# Patient Record
Sex: MALE | Race: WHITE | Employment: OTHER | URBAN - METROPOLITAN AREA
[De-identification: names, ages, dates, MRNs, and addresses within clinical notes are randomized per-mention and may not be internally consistent; named-entity substitution may affect disease eponyms.]

---

## 2017-04-01 ENCOUNTER — ED HISTORICAL/CONVERTED ENCOUNTER (OUTPATIENT)
Dept: OTHER | Age: 67
End: 2017-04-01

## 2018-10-21 ENCOUNTER — IP HISTORICAL/CONVERTED ENCOUNTER (OUTPATIENT)
Dept: OTHER | Age: 68
End: 2018-10-21

## 2018-11-07 ENCOUNTER — OP HISTORICAL/CONVERTED ENCOUNTER (OUTPATIENT)
Dept: OTHER | Age: 68
End: 2018-11-07

## 2018-11-14 ENCOUNTER — HOSPITAL ENCOUNTER (OUTPATIENT)
Dept: LAB | Age: 68
Discharge: HOME | End: 2018-11-14

## 2018-11-14 PROCEDURE — 88312 SPECIAL STAINS GROUP 1: CPT

## 2022-10-15 ENCOUNTER — APPOINTMENT (OUTPATIENT)
Dept: GENERAL RADIOLOGY | Age: 72
DRG: 092 | End: 2022-10-15
Attending: STUDENT IN AN ORGANIZED HEALTH CARE EDUCATION/TRAINING PROGRAM
Payer: MEDICARE

## 2022-10-15 ENCOUNTER — HOSPITAL ENCOUNTER (INPATIENT)
Age: 72
LOS: 1 days | Discharge: LEFT BEFORE DISCHARGE | DRG: 092 | End: 2022-10-17
Attending: STUDENT IN AN ORGANIZED HEALTH CARE EDUCATION/TRAINING PROGRAM | Admitting: INTERNAL MEDICINE
Payer: MEDICARE

## 2022-10-15 ENCOUNTER — APPOINTMENT (OUTPATIENT)
Dept: CT IMAGING | Age: 72
DRG: 092 | End: 2022-10-15
Attending: STUDENT IN AN ORGANIZED HEALTH CARE EDUCATION/TRAINING PROGRAM
Payer: MEDICARE

## 2022-10-15 DIAGNOSIS — R41.0 DELIRIUM: ICD-10-CM

## 2022-10-15 DIAGNOSIS — A41.9 SEPSIS, DUE TO UNSPECIFIED ORGANISM, UNSPECIFIED WHETHER ACUTE ORGAN DYSFUNCTION PRESENT (HCC): ICD-10-CM

## 2022-10-15 DIAGNOSIS — R09.02 HYPOXIA: Primary | ICD-10-CM

## 2022-10-15 PROBLEM — R41.82 ALTERED MENTAL STATUS: Status: ACTIVE | Noted: 2022-10-15

## 2022-10-15 LAB
ALBUMIN SERPL-MCNC: 3.4 G/DL (ref 3.5–5)
ALBUMIN/GLOB SERPL: 0.9 {RATIO} (ref 1.1–2.2)
ALP SERPL-CCNC: 72 U/L (ref 45–117)
ALT SERPL-CCNC: 18 U/L (ref 12–78)
AMPHET UR QL SCN: NEGATIVE
ANION GAP SERPL CALC-SCNC: 10 MMOL/L (ref 5–15)
APPEARANCE UR: CLEAR
AST SERPL W P-5'-P-CCNC: ABNORMAL U/L (ref 15–37)
ATRIAL RATE: 93 BPM
BACTERIA URNS QL MICRO: NEGATIVE /HPF
BARBITURATES UR QL SCN: NEGATIVE
BASOPHILS # BLD: 0.1 K/UL (ref 0–0.1)
BASOPHILS NFR BLD: 1 % (ref 0–1)
BENZODIAZ UR QL: NEGATIVE
BILIRUB SERPL-MCNC: 0.6 MG/DL (ref 0.2–1)
BILIRUB UR QL: NEGATIVE
BNP SERPL-MCNC: 164 PG/ML
BUN SERPL-MCNC: 15 MG/DL (ref 6–20)
BUN/CREAT SERPL: 11 (ref 12–20)
CA-I BLD-MCNC: 8.9 MG/DL (ref 8.5–10.1)
CALCULATED P AXIS, ECG09: 65 DEGREES
CALCULATED R AXIS, ECG10: 13 DEGREES
CALCULATED T AXIS, ECG11: 66 DEGREES
CANNABINOIDS UR QL SCN: NEGATIVE
CHLORIDE SERPL-SCNC: 101 MMOL/L (ref 97–108)
CK SERPL-CCNC: 202 U/L (ref 39–308)
CO2 SERPL-SCNC: 22 MMOL/L (ref 21–32)
COCAINE UR QL SCN: NEGATIVE
COLOR UR: ABNORMAL
COVID-19 RAPID TEST, COVR: NOT DETECTED
CREAT SERPL-MCNC: 1.33 MG/DL (ref 0.7–1.3)
D DIMER PPP FEU-MCNC: 2.2 UG/ML(FEU)
DIAGNOSIS, 93000: NORMAL
DIFFERENTIAL METHOD BLD: ABNORMAL
DRUG SCRN COMMENT,DRGCM: NORMAL
EOSINOPHIL # BLD: 0.3 K/UL (ref 0–0.4)
EOSINOPHIL NFR BLD: 2 % (ref 0–7)
ERYTHROCYTE [DISTWIDTH] IN BLOOD BY AUTOMATED COUNT: 12.4 % (ref 11.5–14.5)
ETHANOL SERPL-MCNC: <10 MG/DL
FLUAV AG NPH QL IA: NEGATIVE
FLUBV AG NOSE QL IA: NEGATIVE
GLOBULIN SER CALC-MCNC: 4 G/DL (ref 2–4)
GLUCOSE BLD STRIP.AUTO-MCNC: 105 MG/DL (ref 65–100)
GLUCOSE BLD STRIP.AUTO-MCNC: 146 MG/DL (ref 65–100)
GLUCOSE BLD STRIP.AUTO-MCNC: 157 MG/DL (ref 65–100)
GLUCOSE BLD STRIP.AUTO-MCNC: 165 MG/DL (ref 65–100)
GLUCOSE SERPL-MCNC: 259 MG/DL (ref 65–100)
GLUCOSE UR STRIP.AUTO-MCNC: 150 MG/DL
HCT VFR BLD AUTO: 46.5 % (ref 36.6–50.3)
HGB BLD-MCNC: 16.1 G/DL (ref 12.1–17)
HGB UR QL STRIP: ABNORMAL
IMM GRANULOCYTES # BLD AUTO: 0.1 K/UL (ref 0–0.04)
IMM GRANULOCYTES NFR BLD AUTO: 1 % (ref 0–0.5)
KETONES UR QL STRIP.AUTO: NEGATIVE MG/DL
LACTATE SERPL-SCNC: 1.4 MMOL/L (ref 0.4–2)
LACTATE SERPL-SCNC: 2.5 MMOL/L (ref 0.4–2)
LEUKOCYTE ESTERASE UR QL STRIP.AUTO: NEGATIVE
LYMPHOCYTES # BLD: 3.2 K/UL (ref 0.8–3.5)
LYMPHOCYTES NFR BLD: 15 % (ref 12–49)
MAGNESIUM SERPL-MCNC: 2 MG/DL (ref 1.6–2.4)
MAGNESIUM SERPL-MCNC: NORMAL MG/DL (ref 1.6–2.4)
MCH RBC QN AUTO: 31.3 PG (ref 26–34)
MCHC RBC AUTO-ENTMCNC: 34.6 G/DL (ref 30–36.5)
MCV RBC AUTO: 90.5 FL (ref 80–99)
METHADONE UR QL: NEGATIVE
MONOCYTES # BLD: 0.8 K/UL (ref 0–1)
MONOCYTES NFR BLD: 4 % (ref 5–13)
MUCOUS THREADS URNS QL MICRO: ABNORMAL /LPF
NEUTS SEG # BLD: 16.2 K/UL (ref 1.8–8)
NEUTS SEG NFR BLD: 77 % (ref 32–75)
NITRITE UR QL STRIP.AUTO: NEGATIVE
NRBC # BLD: 0 K/UL (ref 0–0.01)
NRBC BLD-RTO: 0 PER 100 WBC
OPIATES UR QL: NEGATIVE
P-R INTERVAL, ECG05: 178 MS
PCP UR QL: NEGATIVE
PERFORMED BY, TECHID: ABNORMAL
PH UR STRIP: 5 [PH] (ref 5–8)
PLATELET # BLD AUTO: 333 K/UL (ref 150–400)
PMV BLD AUTO: 9.6 FL (ref 8.9–12.9)
POTASSIUM SERPL-SCNC: 3 MMOL/L (ref 3.5–5.1)
POTASSIUM SERPL-SCNC: ABNORMAL MMOL/L (ref 3.5–5.1)
PROCALCITONIN SERPL-MCNC: <0.05 NG/ML
PROT SERPL-MCNC: 7.4 G/DL (ref 6.4–8.2)
PROT UR STRIP-MCNC: NEGATIVE MG/DL
Q-T INTERVAL, ECG07: 402 MS
QRS DURATION, ECG06: 84 MS
QTC CALCULATION (BEZET), ECG08: 499 MS
RBC # BLD AUTO: 5.14 M/UL (ref 4.1–5.7)
RBC #/AREA URNS HPF: ABNORMAL /HPF (ref 0–5)
SODIUM SERPL-SCNC: 133 MMOL/L (ref 136–145)
SP GR UR REFRACTOMETRY: >1.03 (ref 1–1.03)
TROPONIN-HIGH SENSITIVITY: 17 NG/L (ref 0–76)
TROPONIN-HIGH SENSITIVITY: 38 NG/L (ref 0–76)
UROBILINOGEN UR QL STRIP.AUTO: 0.1 EU/DL (ref 0.1–1)
VENTRICULAR RATE, ECG03: 93 BPM
WBC # BLD AUTO: 20.8 K/UL (ref 4.1–11.1)
WBC URNS QL MICRO: ABNORMAL /HPF (ref 0–4)

## 2022-10-15 PROCEDURE — 82962 GLUCOSE BLOOD TEST: CPT

## 2022-10-15 PROCEDURE — 82077 ASSAY SPEC XCP UR&BREATH IA: CPT

## 2022-10-15 PROCEDURE — G0378 HOSPITAL OBSERVATION PER HR: HCPCS

## 2022-10-15 PROCEDURE — 83605 ASSAY OF LACTIC ACID: CPT

## 2022-10-15 PROCEDURE — 87635 SARS-COV-2 COVID-19 AMP PRB: CPT

## 2022-10-15 PROCEDURE — 81001 URINALYSIS AUTO W/SCOPE: CPT

## 2022-10-15 PROCEDURE — 74011250636 HC RX REV CODE- 250/636: Performed by: STUDENT IN AN ORGANIZED HEALTH CARE EDUCATION/TRAINING PROGRAM

## 2022-10-15 PROCEDURE — 99285 EMERGENCY DEPT VISIT HI MDM: CPT

## 2022-10-15 PROCEDURE — 87040 BLOOD CULTURE FOR BACTERIA: CPT

## 2022-10-15 PROCEDURE — 80307 DRUG TEST PRSMV CHEM ANLYZR: CPT

## 2022-10-15 PROCEDURE — 74011000250 HC RX REV CODE- 250: Performed by: HOSPITALIST

## 2022-10-15 PROCEDURE — 85379 FIBRIN DEGRADATION QUANT: CPT

## 2022-10-15 PROCEDURE — 83880 ASSAY OF NATRIURETIC PEPTIDE: CPT

## 2022-10-15 PROCEDURE — 71045 X-RAY EXAM CHEST 1 VIEW: CPT

## 2022-10-15 PROCEDURE — 96375 TX/PRO/DX INJ NEW DRUG ADDON: CPT

## 2022-10-15 PROCEDURE — 84145 PROCALCITONIN (PCT): CPT

## 2022-10-15 PROCEDURE — 71275 CT ANGIOGRAPHY CHEST: CPT

## 2022-10-15 PROCEDURE — 36415 COLL VENOUS BLD VENIPUNCTURE: CPT

## 2022-10-15 PROCEDURE — 74011000250 HC RX REV CODE- 250: Performed by: STUDENT IN AN ORGANIZED HEALTH CARE EDUCATION/TRAINING PROGRAM

## 2022-10-15 PROCEDURE — 82550 ASSAY OF CK (CPK): CPT

## 2022-10-15 PROCEDURE — 70450 CT HEAD/BRAIN W/O DYE: CPT

## 2022-10-15 PROCEDURE — 85025 COMPLETE CBC W/AUTO DIFF WBC: CPT

## 2022-10-15 PROCEDURE — 74011250637 HC RX REV CODE- 250/637: Performed by: HOSPITALIST

## 2022-10-15 PROCEDURE — 74011000636 HC RX REV CODE- 636: Performed by: STUDENT IN AN ORGANIZED HEALTH CARE EDUCATION/TRAINING PROGRAM

## 2022-10-15 PROCEDURE — 84484 ASSAY OF TROPONIN QUANT: CPT

## 2022-10-15 PROCEDURE — 87804 INFLUENZA ASSAY W/OPTIC: CPT

## 2022-10-15 PROCEDURE — 74011636637 HC RX REV CODE- 636/637: Performed by: HOSPITALIST

## 2022-10-15 PROCEDURE — 84132 ASSAY OF SERUM POTASSIUM: CPT

## 2022-10-15 PROCEDURE — 74011250636 HC RX REV CODE- 250/636: Performed by: HOSPITALIST

## 2022-10-15 PROCEDURE — 83735 ASSAY OF MAGNESIUM: CPT

## 2022-10-15 PROCEDURE — 96365 THER/PROPH/DIAG IV INF INIT: CPT

## 2022-10-15 PROCEDURE — 93005 ELECTROCARDIOGRAM TRACING: CPT

## 2022-10-15 RX ORDER — METOPROLOL SUCCINATE 25 MG/1
25 TABLET, EXTENDED RELEASE ORAL DAILY
Status: DISCONTINUED | OUTPATIENT
Start: 2022-10-15 | End: 2022-10-17 | Stop reason: HOSPADM

## 2022-10-15 RX ORDER — MAGNESIUM SULFATE 100 %
4 CRYSTALS MISCELLANEOUS AS NEEDED
Status: DISCONTINUED | OUTPATIENT
Start: 2022-10-15 | End: 2022-10-17 | Stop reason: HOSPADM

## 2022-10-15 RX ORDER — ACETAMINOPHEN 650 MG/1
650 SUPPOSITORY RECTAL
Status: DISCONTINUED | OUTPATIENT
Start: 2022-10-15 | End: 2022-10-17 | Stop reason: HOSPADM

## 2022-10-15 RX ORDER — POTASSIUM CHLORIDE 750 MG/1
40 TABLET, FILM COATED, EXTENDED RELEASE ORAL
Status: COMPLETED | OUTPATIENT
Start: 2022-10-15 | End: 2022-10-15

## 2022-10-15 RX ORDER — SODIUM CHLORIDE, SODIUM LACTATE, POTASSIUM CHLORIDE, CALCIUM CHLORIDE 600; 310; 30; 20 MG/100ML; MG/100ML; MG/100ML; MG/100ML
125 INJECTION, SOLUTION INTRAVENOUS CONTINUOUS
Status: DISPENSED | OUTPATIENT
Start: 2022-10-15 | End: 2022-10-15

## 2022-10-15 RX ORDER — SODIUM CHLORIDE 0.9 % (FLUSH) 0.9 %
5-10 SYRINGE (ML) INJECTION AS NEEDED
Status: DISCONTINUED | OUTPATIENT
Start: 2022-10-15 | End: 2022-10-17 | Stop reason: HOSPADM

## 2022-10-15 RX ORDER — INSULIN LISPRO 100 [IU]/ML
INJECTION, SOLUTION INTRAVENOUS; SUBCUTANEOUS
Status: DISCONTINUED | OUTPATIENT
Start: 2022-10-15 | End: 2022-10-17 | Stop reason: HOSPADM

## 2022-10-15 RX ORDER — ONDANSETRON 4 MG/1
4 TABLET, ORALLY DISINTEGRATING ORAL
Status: DISCONTINUED | OUTPATIENT
Start: 2022-10-15 | End: 2022-10-17 | Stop reason: HOSPADM

## 2022-10-15 RX ORDER — ONDANSETRON 2 MG/ML
4 INJECTION INTRAMUSCULAR; INTRAVENOUS
Status: COMPLETED | OUTPATIENT
Start: 2022-10-15 | End: 2022-10-15

## 2022-10-15 RX ORDER — SODIUM CHLORIDE 9 MG/ML
100 INJECTION, SOLUTION INTRAVENOUS CONTINUOUS
Status: DISCONTINUED | OUTPATIENT
Start: 2022-10-15 | End: 2022-10-17 | Stop reason: HOSPADM

## 2022-10-15 RX ORDER — ONDANSETRON 2 MG/ML
4 INJECTION INTRAMUSCULAR; INTRAVENOUS
Status: DISCONTINUED | OUTPATIENT
Start: 2022-10-15 | End: 2022-10-17 | Stop reason: HOSPADM

## 2022-10-15 RX ORDER — SODIUM CHLORIDE 0.9 % (FLUSH) 0.9 %
5-40 SYRINGE (ML) INJECTION AS NEEDED
Status: DISCONTINUED | OUTPATIENT
Start: 2022-10-15 | End: 2022-10-17 | Stop reason: HOSPADM

## 2022-10-15 RX ORDER — ACETAMINOPHEN 325 MG/1
650 TABLET ORAL
Status: DISCONTINUED | OUTPATIENT
Start: 2022-10-15 | End: 2022-10-17 | Stop reason: HOSPADM

## 2022-10-15 RX ORDER — SODIUM CHLORIDE 0.9 % (FLUSH) 0.9 %
5-40 SYRINGE (ML) INJECTION EVERY 8 HOURS
Status: DISCONTINUED | OUTPATIENT
Start: 2022-10-15 | End: 2022-10-17 | Stop reason: HOSPADM

## 2022-10-15 RX ADMIN — SODIUM CHLORIDE 100 ML/HR: 9 INJECTION, SOLUTION INTRAVENOUS at 23:13

## 2022-10-15 RX ADMIN — SODIUM CHLORIDE, PRESERVATIVE FREE 10 ML: 5 INJECTION INTRAVENOUS at 07:57

## 2022-10-15 RX ADMIN — CEFTRIAXONE 2 G: 2 INJECTION, POWDER, FOR SOLUTION INTRAMUSCULAR; INTRAVENOUS at 03:03

## 2022-10-15 RX ADMIN — INSULIN LISPRO 2 UNITS: 100 INJECTION, SOLUTION INTRAVENOUS; SUBCUTANEOUS at 12:16

## 2022-10-15 RX ADMIN — POTASSIUM CHLORIDE 40 MEQ: 750 TABLET, FILM COATED, EXTENDED RELEASE ORAL at 06:40

## 2022-10-15 RX ADMIN — INSULIN LISPRO 2 UNITS: 100 INJECTION, SOLUTION INTRAVENOUS; SUBCUTANEOUS at 07:56

## 2022-10-15 RX ADMIN — SODIUM CHLORIDE, PRESERVATIVE FREE 10 ML: 5 INJECTION INTRAVENOUS at 15:21

## 2022-10-15 RX ADMIN — ONDANSETRON 4 MG: 2 INJECTION INTRAMUSCULAR; INTRAVENOUS at 01:44

## 2022-10-15 RX ADMIN — SODIUM CHLORIDE 1000 ML: 9 INJECTION, SOLUTION INTRAVENOUS at 01:44

## 2022-10-15 RX ADMIN — SODIUM CHLORIDE, POTASSIUM CHLORIDE, SODIUM LACTATE AND CALCIUM CHLORIDE 125 ML/HR: 600; 310; 30; 20 INJECTION, SOLUTION INTRAVENOUS at 06:48

## 2022-10-15 RX ADMIN — SODIUM CHLORIDE, PRESERVATIVE FREE 10 ML: 5 INJECTION INTRAVENOUS at 21:07

## 2022-10-15 RX ADMIN — AZITHROMYCIN DIHYDRATE 500 MG: 500 INJECTION, POWDER, LYOPHILIZED, FOR SOLUTION INTRAVENOUS at 03:02

## 2022-10-15 RX ADMIN — METOPROLOL SUCCINATE 25 MG: 25 TABLET, EXTENDED RELEASE ORAL at 09:01

## 2022-10-15 RX ADMIN — IOPAMIDOL 100 ML: 755 INJECTION, SOLUTION INTRAVENOUS at 03:04

## 2022-10-15 RX ADMIN — INSULIN LISPRO 2 UNITS: 100 INJECTION, SOLUTION INTRAVENOUS; SUBCUTANEOUS at 16:32

## 2022-10-15 NOTE — ED NOTES
Pt 88% on room air. Pt placed on 3L nasal cannula and up to 95% on room air. Pt denies any drug or alcohol use tonight. GCS of 15.  Answers all questions appropriately but appears drowsy during conversation but is easily arousable

## 2022-10-15 NOTE — CONSULTS
Consult Date: 10/15/2022    Consults Mr. Ajith Vera is a 80-year-old man with history of traumatic brain injury, history of stroke who was brought in by a friend because he was found unresponsive on the floor. This is his roommate who is not available at bedside. I did talk to his sister on the phone who tells me that he has a history of some \"events\" that only happen in sleep. These have been evaluated by neurologist in the past and deemed that they would not seizures. He is not on any antiepileptics. CT scan of the head showed an old lacunar infarct but no acute changes. Lactate elevated at 2.5. He is awake and alert and no evidence of labs or imaging of acute infection. He does tell me at the end of the visit that he has been taking some sleeping medications from a woman and does not remember the name. The last time he took it was day before yesterday. Subjective     Past Medical History:   Diagnosis Date    CVA (cerebral vascular accident) ([facility] [address]     Lacunar    HTN (hypertension)     Left eye injury     With ectropion    Traumatic brain injury       History reviewed. No pertinent surgical history.   Family History   Problem Relation Age of Onset    Lung Cancer Mother       Social History     Tobacco Use    Smoking status: Every Day     Packs/day: 1.00     Years: 50.00     Pack years: 50.00     Types: Cigarettes    Smokeless tobacco: Never   Substance Use Topics    Alcohol use: Not Currently       Current Facility-Administered Medications   Medication Dose Route Frequency Provider Last Rate Last Admin    sodium chloride (NS) flush 5-10 mL  5-10 mL IntraVENous PRN Nate Delatorre MD        cefTRIAXone (ROCEPHIN) 2 g in sterile water (preservative free) 20 mL IV syringe  2 g IntraVENous Q24H Nate Delatorre MD   2 g at 10/15/22 0303    azithromycin (ZITHROMAX) 500 mg in 0.9% sodium chloride 250 mL ([id number])  500 mg IntraVENous Q24H Bin Brice MD   IV Completed at 10/15/22 0431    sodium chloride (NS) flush 5-40 mL  5-40 mL IntraVENous Q8H Deng Almeida MD   10 mL at 10/15/22 0757    sodium chloride (NS) flush 5-40 mL  5-40 mL IntraVENous PRN Deng Almeida MD        acetaminophen (TYLENOL) tablet 650 mg  650 mg Oral Q6H PRN Deng Almeida MD        Or    acetaminophen (TYLENOL) suppository 650 mg  650 mg Rectal Q6H PRN Deng Almeida MD        ondansetron (ZOFRAN ODT) tablet 4 mg  4 mg Oral Q6H PRN Deng Almeida MD        Or    ondansetron (ZOFRAN) injection 4 mg  4 mg IntraVENous Q6H PRN Deng Almeida MD        0.9% sodium chloride infusion  100 mL/hr IntraVENous CONTINUOUS Deng Almeida MD        insulin lispro (HUMALOG) injection   SubCUTAneous AC&HS Deng Almeida MD   2 Units at 10/15/22 0756    glucose chewable tablet 16 g  4 Tablet Oral PRN Deng Almeida MD        glucagon (GLUCAGEN) injection 1 mg  1 mg IntraMUSCular PRN Deng Almeida MD        metoprolol succinate (TOPROL-XL) XL tablet 25 mg  25 mg Oral DAILY Deng Almeida MD   25 mg at 10/15/22 0901    lactated Ringers infusion  125 mL/hr IntraVENous CONTINUOUS Deng Almeida  mL/hr at 10/15/22 0648 125 mL/hr at 10/15/22 1092        Review of Systems   All other systems reviewed and are negative. Objective     Vital signs for last 24 hours:  Visit Vitals  BP (!) 171/104   Pulse 98   Temp 98.1 °F (36.7 °C)   Resp 16   Ht 5' 8\" (1.727 m)   Wt 68 kg (150 lb)   SpO2 96%   BMI 22.81 kg/m²       Intake/Output this shift:  Current Shift: No intake/output data recorded. Last 3 Shifts: No intake/output data recorded.     Data Review:   Recent Results (from the past 24 hour(s))   CBC WITH AUTOMATED DIFF    Collection Time: 10/15/22  1:51 AM   Result Value Ref Range    WBC 20.8 (H) 4.1 - 11.1 K/uL    RBC 5.14 4.10 - 5.70 M/uL    HGB 16.1 12.1 - 17.0 g/dL    HCT 46.5 36.6 - 50.3 %    MCV 90.5 80.0 - 99.0 FL    MCH 31.3 26.0 - 34.0 PG    MCHC 34.6 30.0 - 36.5 g/dL    RDW 12.4 11.5 - 14.5 %    PLATELET 566 060 - 217 K/uL    MPV 9.6 8.9 - 12.9 FL    NRBC 0.0 0.0  WBC    ABSOLUTE NRBC 0.00 0.00 - 0.01 K/uL    NEUTROPHILS 77 (H) 32 - 75 %    LYMPHOCYTES 15 12 - 49 %    MONOCYTES 4 (L) 5 - 13 %    EOSINOPHILS 2 0 - 7 %    BASOPHILS 1 0 - 1 %    IMMATURE GRANULOCYTES 1 (H) 0 - 0.5 %    ABS. NEUTROPHILS 16.2 (H) 1.8 - 8.0 K/UL    ABS. LYMPHOCYTES 3.2 0.8 - 3.5 K/UL    ABS. MONOCYTES 0.8 0.0 - 1.0 K/UL    ABS. EOSINOPHILS 0.3 0.0 - 0.4 K/UL    ABS. BASOPHILS 0.1 0.0 - 0.1 K/UL    ABS. IMM. GRANS. 0.1 (H) 0.00 - 0.04 K/UL    DF AUTOMATED     METABOLIC PANEL, COMPREHENSIVE    Collection Time: 10/15/22  1:51 AM   Result Value Ref Range    Sodium 133 (L) 136 - 145 mmol/L    Potassium Hemolyzed, recollect requested 3.5 - 5.1 mmol/L    Chloride 101 97 - 108 mmol/L    CO2 22 21 - 32 mmol/L    Anion gap 10 5 - 15 mmol/L    Glucose 259 (H) 65 - 100 mg/dL    BUN 15 6 - 20 mg/dL    Creatinine 1.33 (H) 0.70 - 1.30 mg/dL    BUN/Creatinine ratio 11 (L) 12 - 20      eGFR 57 (L) >60 ml/min/1.73m2    Calcium 8.9 8.5 - 10.1 mg/dL    Bilirubin, total 0.6 0.2 - 1.0 mg/dL    AST (SGOT) Hemolyzed, recollect requested 15 - 37 U/L    ALT (SGPT) 18 12 - 78 U/L    Alk.  phosphatase 72 45 - 117 U/L    Protein, total 7.4 6.4 - 8.2 g/dL    Albumin 3.4 (L) 3.5 - 5.0 g/dL    Globulin 4.0 2.0 - 4.0 g/dL    A-G Ratio 0.9 (L) 1.1 - 2.2     NT-PRO BNP    Collection Time: 10/15/22  1:51 AM   Result Value Ref Range    NT pro- (H) <125 pg/mL   TROPONIN-HIGH SENSITIVITY    Collection Time: 10/15/22  1:51 AM   Result Value Ref Range    Troponin-High Sensitivity 17 0 - 76 ng/L   MAGNESIUM    Collection Time: 10/15/22  1:51 AM   Result Value Ref Range    Magnesium Hemolyzed, recollect requested 1.6 - 2.4 mg/dL   CK    Collection Time: 10/15/22  1:51 AM   Result Value Ref Range     39 - 308 U/L   ETHYL ALCOHOL    Collection Time: 10/15/22  1:51 AM   Result Value Ref Range ALCOHOL(ETHYL),SERUM <10 <10 mg/dL   D DIMER    Collection Time: 10/15/22  1:51 AM   Result Value Ref Range    D DIMER 2.20 (H) <0.50 ug/ml(FEU)   EKG, 12 LEAD, INITIAL    Collection Time: 10/15/22  1:58 AM   Result Value Ref Range    Ventricular Rate 93 BPM    Atrial Rate 93 BPM    P-R Interval 178 ms    QRS Duration 84 ms    Q-T Interval 402 ms    QTC Calculation (Bezet) 499 ms    Calculated P Axis 65 degrees    Calculated R Axis 13 degrees    Calculated T Axis 66 degrees    Diagnosis       Normal sinus rhythm  Possible Left atrial enlargement  Prolonged QT  Abnormal ECG  No previous ECGs available  Confirmed by VERNON Robledo MDH ([id number]) on 10/15/2022 11:46:55 AM     COVID-19 RAPID TEST    Collection Time: 10/15/22  2:04 AM   Result Value Ref Range    COVID-19 rapid test Not Detected Not Detected     INFLUENZA A & B AG (RAPID TEST)    Collection Time: 10/15/22  2:04 AM   Result Value Ref Range    Influenza A Antigen Negative Negative      Influenza B Antigen Negative Negative     TROPONIN-HIGH SENSITIVITY    Collection Time: 10/15/22  2:56 AM   Result Value Ref Range    Troponin-High Sensitivity 38 0 - 76 ng/L   CULTURE, BLOOD, PAIRED    Collection Time: 10/15/22  2:56 AM    Specimen: Blood   Result Value Ref Range    Special Requests: No Special Requests      Culture result: No growth after 7 hours     LACTIC ACID    Collection Time: 10/15/22  2:56 AM   Result Value Ref Range    Lactic acid 2.5 (HH) 0.4 - 2.0 mmol/L   PROCALCITONIN    Collection Time: 10/15/22  2:56 AM   Result Value Ref Range    Procalcitonin <0.05 (H) 0 ng/mL   POTASSIUM    Collection Time: 10/15/22  2:56 AM   Result Value Ref Range    Potassium 3.0 (L) 3.5 - 5.1 mmol/L   MAGNESIUM    Collection Time: 10/15/22  2:56 AM   Result Value Ref Range    Magnesium 2.0 1.6 - 2.4 mg/dL   LACTIC ACID    Collection Time: 10/15/22  5:03 AM   Result Value Ref Range    Lactic acid 1.4 0.4 - 2.0 mmol/L   URINALYSIS W/MICROSCOPIC    Collection Time: 10/15/22 5:46 AM   Result Value Ref Range    Color Yellow/Straw      Appearance Clear Clear      Specific gravity >1.030 (H) 1.003 - 1.030    pH (UA) 5.0 5.0 - 8.0      Protein Negative Negative mg/dL    Glucose 150 (A) Negative mg/dL    Ketone Negative Negative mg/dL    Bilirubin Negative Negative      Blood Small (A) Negative      Urobilinogen 0.1 0.1 - 1.0 EU/dL    Nitrites Negative Negative      Leukocyte Esterase Negative Negative      WBC 0-4 0 - 4 /hpf    RBC 0-5 0 - 5 /hpf    Bacteria Negative Negative /hpf    Mucus Trace /lpf   DRUG SCREEN, URINE    Collection Time: 10/15/22  5:46 AM   Result Value Ref Range    AMPHETAMINES Negative Negative      BARBITURATES Negative Negative      BENZODIAZEPINES Negative Negative      COCAINE Negative Negative      METHADONE Negative Negative      OPIATES Negative Negative      PCP(PHENCYCLIDINE) Negative Negative      THC (TH-CANNABINOL) Negative Negative      Drug screen comment        This test is a screen for drugs of abuse in a medical setting only (i.e., they are unconfirmed results and as such must not be used for non-medical purposes, e.g.,employment testing, legal testing). Due to its inherent nature, false positive (FP) and false negative (FN) results may be obtained. Therefore, if necessary for medical care, recommend confirmation of positive findings by GC/MS. GLUCOSE, POC    Collection Time: 10/15/22  7:47 AM   Result Value Ref Range    Glucose (POC) 146 (H) 65 - 100 mg/dL    Performed by Ignacio Brumfield        Physical Exam    Neuro Physical Exam      General: Well developed, well nourished. Patient in no apparent distress. Cardiac: Regular rate and rhythm       Neurological Exam:  Mental Status: Awake, alert, oriented x3, fast pressured speech   Cranial Nerves:   Intact visual fields. PERRL, EOM's full, no nystagmus, no ptosis. Facial sensation is normal. Facial movement is symmetric. Palate is midline. Normal sternocleidomastoid strength. Tongue is midline. Hearing is intact bilaterally. Motor:  5/5 strength in upper and lower proximal and distal muscles. Normal bulk and tone. Reflexes:   Deep tendon reflexes 2+/4 and symmetrical.   Sensory:   Normal to light touch throughout   Gait:  Deferred   Tremor:   No tremor noted. Cerebellar:  No cerebellar signs present. Babinski:     Down bilaterally      Assessment and plan: Mr. Guicho Aleman is a 77-year-old man with possible toxic encephalopathy. He is awake alert and oriented now and able to follow all commands although he does have some fast pressured speech. No further neurological work-up at this time. Will follow clinically.

## 2022-10-15 NOTE — H&P
Hospitalist History & Physical Notes. [facility]. Name : Arabella Boyce      MRN number : [id number]     YOB: 1950     Subjective :   Chief Complaint : Altered mental status, not breathing    Source of information : Mostly from the ED provider and staff. Care everywhere records reviewed. Patient is very sleepy unable to provide information    History of present illness:   67 y.o. male with history of CVA, hypertension, traumatic brain injury as per care everywhere records presents to the emergency room by EMS with altered mental status. Not sure who called and who he lives with. EMS found him with significant altered mental status and also his breathing was not good. He was given 3 mg of Narcan and Zofran IV, patient became more alert and brought to the emergency room. He is consciousness is fluctuating, sometimes he is waking up able to follow commands and answer questions. Sometimes he is just sleeping. No fever or chills. No nausea or vomiting. In the emergency room he found somnolent most of the time sometimes he wakes up and able to communicate. His lactic acid is 2.5, there is leukocytosis on laboratory data so concerned about pneumonia given antibiotics. But his procalcitonin is less than 0.05, and patient looks comfortable sleeping no pulmonary symptoms. As per emergency room physician he woke up very cold and able to communicate. He was hypoxic on arrival to the emergency room. Past medical history: Hypertension, injury to the eye. Past surgical history: Left eye ectropion surgery was scheduled but not sure was done or not.       Family History   Problem Relation Age of Onset    Lung Cancer Mother       Social History     Tobacco Use    Smoking status: Every Day     Packs/day: 1.00     Years: 50.00     Pack years: 50.00     Types: Cigarettes    Smokeless tobacco: Never   Substance Use Topics    Alcohol use: Not Currently Allergies: No known medication allergies         Review of Systems: At the time I am seeing he was already worked up sleeping in the bed, did not wake up for verbal commands or touch. I did not try to force wake him up. As per ED provider he was very alert and oriented able to give some information. Vitals:   Patient Vitals for the past 12 hrs:   Temp Pulse Resp BP SpO2   10/15/22 0351 -- 98 16 -- 95 %   10/15/22 0330 -- 94 (!) 5 (!) 142/82 (!) 89 %   10/15/22 0327 -- -- -- (!) 166/100 --   10/15/22 0310 -- 99 10 -- 94 %   10/15/22 0213 -- 94 23 -- 95 %   10/15/22 0158 -- 97 -- (!) 159/102 95 %   10/15/22 0128 -- -- -- -- (!) 89 %   10/15/22 0128 -- 100 20 (!) 139/91 --   10/15/22 0126 98.1 °F (36.7 °C) 100 20 (!) 139/91 --       Physical Exam:   General : Slightly shriveled, no acute distress. On O2 nasal cannula due to hypoxia on arrival.. HEENT : PERRLA, normal oral mucosa, atraumatic normocephalic, Normal ear and nose. Neck : Supple, no JVD, no masses noted, no carotid bruit. Lungs : Breath sounds with moderate air entry bilaterally, not taking deep breaths as he is sleeping. No active wheezes or rales, no accessory muscle use. CVS : Rhythm rate regular, S1+, S2+, no murmur or gallop. Monitor with sinus rhythm. Abdomen : Soft, nontender,  bowel sounds active. Extremities : No edema noted,  pedal pulses palpable. Musculoskeletal : Decreased muscle mass and tone. No joint swelling or effusion,   Skin :, Dry, poor skin turgor. No pathological rash. Lymphatic : No cervical lymphadenopathy. Neurological : Sleeping unable to evaluate.   Per emergency room physician no neurological deficits noted   psychiatric : Sleeping dry        Data Review:   Recent Results (from the past 24 hour(s))   CBC WITH AUTOMATED DIFF    Collection Time: 10/15/22  1:51 AM   Result Value Ref Range    WBC 20.8 (H) 4.1 - 11.1 K/uL    RBC 5.14 4.10 - 5.70 M/uL    HGB 16.1 12.1 - 17.0 g/dL    HCT 46.5 36.6 - 50.3 %    MCV 90.5 80.0 - 99.0 FL    MCH 31.3 26.0 - 34.0 PG    MCHC 34.6 30.0 - 36.5 g/dL    RDW 12.4 11.5 - 14.5 %    PLATELET 255 545 - 237 K/uL    MPV 9.6 8.9 - 12.9 FL    NRBC 0.0 0.0  WBC    ABSOLUTE NRBC 0.00 0.00 - 0.01 K/uL    NEUTROPHILS 77 (H) 32 - 75 %    LYMPHOCYTES 15 12 - 49 %    MONOCYTES 4 (L) 5 - 13 %    EOSINOPHILS 2 0 - 7 %    BASOPHILS 1 0 - 1 %    IMMATURE GRANULOCYTES 1 (H) 0 - 0.5 %    ABS. NEUTROPHILS 16.2 (H) 1.8 - 8.0 K/UL    ABS. LYMPHOCYTES 3.2 0.8 - 3.5 K/UL    ABS. MONOCYTES 0.8 0.0 - 1.0 K/UL    ABS. EOSINOPHILS 0.3 0.0 - 0.4 K/UL    ABS. BASOPHILS 0.1 0.0 - 0.1 K/UL    ABS. IMM. GRANS. 0.1 (H) 0.00 - 0.04 K/UL    DF AUTOMATED     METABOLIC PANEL, COMPREHENSIVE    Collection Time: 10/15/22  1:51 AM   Result Value Ref Range    Sodium 133 (L) 136 - 145 mmol/L    Potassium Hemolyzed, recollect requested 3.5 - 5.1 mmol/L    Chloride 101 97 - 108 mmol/L    CO2 22 21 - 32 mmol/L    Anion gap 10 5 - 15 mmol/L    Glucose 259 (H) 65 - 100 mg/dL    BUN 15 6 - 20 mg/dL    Creatinine 1.33 (H) 0.70 - 1.30 mg/dL    BUN/Creatinine ratio 11 (L) 12 - 20      eGFR 57 (L) >60 ml/min/1.73m2    Calcium 8.9 8.5 - 10.1 mg/dL    Bilirubin, total 0.6 0.2 - 1.0 mg/dL    AST (SGOT) Hemolyzed, recollect requested 15 - 37 U/L    ALT (SGPT) 18 12 - 78 U/L    Alk.  phosphatase 72 45 - 117 U/L    Protein, total 7.4 6.4 - 8.2 g/dL    Albumin 3.4 (L) 3.5 - 5.0 g/dL    Globulin 4.0 2.0 - 4.0 g/dL    A-G Ratio 0.9 (L) 1.1 - 2.2     NT-PRO BNP    Collection Time: 10/15/22  1:51 AM   Result Value Ref Range    NT pro- (H) <125 pg/mL   TROPONIN-HIGH SENSITIVITY    Collection Time: 10/15/22  1:51 AM   Result Value Ref Range    Troponin-High Sensitivity 17 0 - 76 ng/L   MAGNESIUM    Collection Time: 10/15/22  1:51 AM   Result Value Ref Range    Magnesium Hemolyzed, recollect requested 1.6 - 2.4 mg/dL   CK    Collection Time: 10/15/22  1:51 AM   Result Value Ref Range     39 - 308 U/L   ETHYL ALCOHOL    Collection Time: 10/15/22  1:51 AM   Result Value Ref Range    ALCOHOL(ETHYL),SERUM <10 <10 mg/dL   D DIMER    Collection Time: 10/15/22  1:51 AM   Result Value Ref Range    D DIMER 2.20 (H) <0.50 ug/ml(FEU)   COVID-19 RAPID TEST    Collection Time: 10/15/22  2:04 AM   Result Value Ref Range    COVID-19 rapid test Not Detected Not Detected     INFLUENZA A & B AG (RAPID TEST)    Collection Time: 10/15/22  2:04 AM   Result Value Ref Range    Influenza A Antigen Negative Negative      Influenza B Antigen Negative Negative     LACTIC ACID    Collection Time: 10/15/22  2:56 AM   Result Value Ref Range    Lactic acid 2.5 (HH) 0.4 - 2.0 mmol/L   PROCALCITONIN    Collection Time: 10/15/22  2:56 AM   Result Value Ref Range    Procalcitonin <0.05 (H) 0 ng/mL   POTASSIUM    Collection Time: 10/15/22  2:56 AM   Result Value Ref Range    Potassium 3.0 (L) 3.5 - 5.1 mmol/L   MAGNESIUM    Collection Time: 10/15/22  2:56 AM   Result Value Ref Range    Magnesium 2.0 1.6 - 2.4 mg/dL       Radiologic Studies :   CT Results  (Last 48 hours)                 10/15/22 0304  CT HEAD WO CONT Final result    Impression:  No acute abnormality. Narrative:  EXAM: CT HEAD WO CONT       INDICATION: altered mental status       COMPARISON: None. CONTRAST: None. TECHNIQUE: Unenhanced CT of the head was performed using 5 mm images. Brain and   bone windows were generated. Coronal and sagittal reformats. CT dose reduction   was achieved through use of a standardized protocol tailored for this   examination and automatic exposure control for dose modulation. FINDINGS:   The ventricles and sulci are normal in size, shape and configuration. . Lacunar   infarcts are noted in the left caudate and basal ganglia. . There is no   intracranial hemorrhage, extra-axial collection, or mass effect. The basilar   cisterns are open. No CT evidence of acute infarct. The bone windows demonstrate no abnormalities.  The visualized portions of the paranasal sinuses and mastoid air cells are clear. Vascular calcification is   noted. 10/15/22 0304  CTA CHEST W OR W WO CONT Final result    Impression:  Bibasilar dependent atelectasis. No evidence of pulmonary embolism. Narrative:  EXAM:  CTA CHEST W OR W WO CONT       INDICATION:   DD elevated, hypoxia, SOB       COMPARISON: None. TECHNIQUE:    Precontrast  images were obtained to localize the volume for acquisition. Multislice helical CT arteriography was performed from the diaphragm to the   thoracic inlet during uneventful rapid bolus of 100 cc Isovue-370. Lung and soft   tissue windows were generated. Coronal and sagittal images were generated and   3D post processing consisting of coronal maximum intensity images was performed. CT dose reduction was achieved through use of a standardized protocol tailored   for this examination and automatic exposure control for dose modulation. FINDINGS:   CHEST:   THYROID: No nodule. MEDIASTINUM: No mass or lymphadenopathy. DIANA: No mass or lymphadenopathy. THORACIC AORTA: No dissection or aneurysm. MAIN PULMONARY ARTERY: There is no evidence of pulmonary embolism. TRACHEA/BRONCHI: Patent. ESOPHAGUS: No wall thickening or dilatation. HEART: Normal in size. PLEURA: No effusion or pneumothorax. LUNGS: Bibasilar dependent atelectasis. No focal infiltrate is identified. INCIDENTALLY IMAGED UPPER ABDOMEN: Bilateral simple renal cysts, no follow-up   required. BONES: Degenerative changes are seen in the thoracic spine. CXR Results  (Last 48 hours)                 10/15/22 0207  XR CHEST PORT Final result    Impression:      No acute process on portable chest.           Narrative:  EXAM:  XR CHEST PORT       INDICATION: Hypoxia       COMPARISON: 11/7/2018       TECHNIQUE: Upright portable chest AP view       FINDINGS: The cardiomediastinal and hilar contours are within normal limits.  The pulmonary vasculature is within normal limits. The lungs and pleural spaces are clear. The visualized bones and upper abdomen   are age-appropriate. Assessment and Plan : Altered mental status: Etiology unclear at this time. Suspected sepsis but no evidence of source. Elevated WBC count with left shift, mild elevation of lactic acid. I do not see a urine analysis on labs. Suspected sepsis: Source unclear at this time as mentioned. I do not see a urine analysis on the labs. Ordered  for stat collection. There is leukocytosis with left shift, there is no temperature on vitals. No skin lesions are noted. Suspected pneumonia: But no respiratory symptoms, chest x-ray no significant signs of pneumonia. He is already given Zithromax and ceftriaxone in the emergency room. I doubt it is pulmonary infection. So we will not resume at this time. Hypertension with tachycardia: Not controlled. Not sure what home medications he takes, not available at this time. Due to tachycardia and elevated blood pressure will start on metoprolol 25 mg daily, if not well controlled we will add ARB. Diabetes mellitus type 2 uncontrolled with hyperglycemia: Seems to have diabetes history, but no medications available at this time. We will ask for Accu-Cheks with a sliding scale insulin, try to get home medications. Meanwhile we will hold off on any scheduled medications due to his current situation    History of traumatic brain injury: Not sure if he had any consequences information not available. Admitted to medical telemetry, full CODE STATUS by default, patient cannot make decisions at this time, once he is completely clear we will discuss with him. No advance medical directives. Home medications unable to verify to get the information.     CC : Ko La DO  Signed By: Yared Jefferson MD     October 15, 2022      This dictation was done by dragon, computer voice recognition software. Often unanticipated grammatical, syntax, Barker phones and other interpretive errors are inadvertently transcribed. Please excuse errors that have escaped final proofreading.

## 2022-10-15 NOTE — PROGRESS NOTES
CM attempted to complete assessment with patient at bedside, but patient is alert to self only. Patient's phone number in chart was called and is not a correct number for patient or anyone related to patient. No other contacts noted for patient. At this time patient is listed as Self-pay. If patient is to have any CM needs at discharge, family and insurance would need to be verified.

## 2022-10-15 NOTE — PROGRESS NOTES
10/15/2022      Patient seen for same-day follow-up. He is a 51-year-old male with history of CVA, hypertension and traumatic brain injury brought in due to altered mental status. He was apparently found unresponsive on the floor by his roommate. His lactate was elevated at 2.5. There was some concern for sepsis although no source identified. Patient was awake and alert on admission. He was seen by neurology this morning who feels this may represent a toxic encephalopathy.   No further neurologic work-up was recommended    Will observe for the next 24 hours, likely discharge tomorrow morning              Gail Scott MD

## 2022-10-15 NOTE — ED TRIAGE NOTES
EMS called for unresponsive, not breathing after using unknown drugs. Pt revived with narcan 3 mg and zofran 4 mg iv. Pt alert, oriented.

## 2022-10-15 NOTE — ED PROVIDER NOTES
[id number]  EMERGENCY DEPARTMENT ENCOUNTER NOTE    Date: 10/15/2022  Patient Name: Ignacia Barnard    History of Presenting Illness     Chief Complaint   Patient presents with    Altered mental status     HPI: Ignacia Barnard, 67 y.o. male with a past medical history and outpatient medications as listed and reviewed below  presents for AMS. EMS were called for an unresponsive individual.  On their arrival, he was not breathing. There was reports of drug use over the patient denies any drugs. He received 3 mg of Narcan intranasally and 4 mg of Zofran IV after he started vomiting due to the response to Narcan. He arrived alert and oriented however was hypoxic and was placed on 3 L nasal cannula with improvement of his oxygen from 88% to 95%. He answers appropriately however does appear to be drowsy. He denies any alcohol or drug use. He denies any chest pain or shortness of breath. No abdominal pain, nausea, or vomiting. Has had cough for the past few days. No fevers or chills. No trauma. No headaches. No history of similar. Medical History   I reviewed the medical, surgical, family, and social history, as well as allergies:    PCP: Antonio Tavera DO    Past Medical History:  Past Medical History:   Diagnosis Date    HTN (hypertension)      Past Surgical History:  No past surgical history on file. Current Outpatient Medications:  No current outpatient medications   Family History:  No family history on file. Social History: Allergies:  No Known Allergies    Review of Systems     Review of Systems  Negative: Positives and pertinent negatives as per HPI. All other systems were reviewed and are negative. Physical Exam & Vital Signs   Vital Signs - I reviewed the patient's vital signs.     Patient Vitals for the past 12 hrs:   Temp Pulse Resp BP SpO2   10/15/22 0327 -- -- -- (!) 166/100 --   10/15/22 0310 -- 99 10 -- 94 %   10/15/22 0213 -- 94 23 -- 95 % 10/15/22 0158 -- 97 -- (!) 159/102 95 %   10/15/22 0128 -- -- -- -- (!) 89 %   10/15/22 0128 -- 100 20 (!) 139/91 --   10/15/22 0126 98.1 °F (36.7 °C) 100 20 (!) 139/91 --     Physical Exam:    GENERAL: awake, alert, cooperative, not in distress  HEENT:  * Pupils equal, EOMI  * Head atraumatic  CV:  * audible heart sounds  * warm and perfused extremities bilaterally  PULMONARY: Good air movement, no wheezes, no crackles  ABDOMEN/: soft, no distension, no guarding, no abdominal tenderness  EXTREMITIES/BACK: warm and perfused, no tenderness, no edema  SKIN: no rashes or signs of trauma  NEURO:  * Speech clear  * Moves U&LE to command    Medical Decision Making     Patient is a 67 y.o. male presenting for transient AMS. Vitals reveal  hypoxia  and physical exam reveals no significant abnormalities. EKG showed no significant abnormalities. Based on the history, physical exam, risk factors, and vital signs, differential includes: Cardiac syncope, ACS, arrhythmia, ICH, pneumonia, sepsis, PE, drug overdose, rhabdomyolysis, CHARLES. See ED Course and Reassessment for evaluation and discussion. EMR Automatically Imported Results     Labs:  Recent Results (from the past 12 hour(s))   CBC WITH AUTOMATED DIFF    Collection Time: 10/15/22  1:51 AM   Result Value Ref Range    WBC 20.8 (H) 4.1 - 11.1 K/uL    RBC 5.14 4.10 - 5.70 M/uL    HGB 16.1 12.1 - 17.0 g/dL    HCT 46.5 36.6 - 50.3 %    MCV 90.5 80.0 - 99.0 FL    MCH 31.3 26.0 - 34.0 PG    MCHC 34.6 30.0 - 36.5 g/dL    RDW 12.4 11.5 - 14.5 %    PLATELET 146 922 - 660 K/uL    MPV 9.6 8.9 - 12.9 FL    NRBC 0.0 0.0  WBC    ABSOLUTE NRBC 0.00 0.00 - 0.01 K/uL    NEUTROPHILS 77 (H) 32 - 75 %    LYMPHOCYTES 15 12 - 49 %    MONOCYTES 4 (L) 5 - 13 %    EOSINOPHILS 2 0 - 7 %    BASOPHILS 1 0 - 1 %    IMMATURE GRANULOCYTES 1 (H) 0 - 0.5 %    ABS. NEUTROPHILS 16.2 (H) 1.8 - 8.0 K/UL    ABS. LYMPHOCYTES 3.2 0.8 - 3.5 K/UL    ABS. MONOCYTES 0.8 0.0 - 1.0 K/UL    ABS. EOSINOPHILS 0.3 0.0 - 0.4 K/UL    ABS. BASOPHILS 0.1 0.0 - 0.1 K/UL    ABS. IMM. GRANS. 0.1 (H) 0.00 - 0.04 K/UL    DF AUTOMATED     METABOLIC PANEL, COMPREHENSIVE    Collection Time: 10/15/22  1:51 AM   Result Value Ref Range    Sodium 133 (L) 136 - 145 mmol/L    Potassium Hemolyzed, recollect requested 3.5 - 5.1 mmol/L    Chloride 101 97 - 108 mmol/L    CO2 22 21 - 32 mmol/L    Anion gap 10 5 - 15 mmol/L    Glucose 259 (H) 65 - 100 mg/dL    BUN 15 6 - 20 mg/dL    Creatinine 1.33 (H) 0.70 - 1.30 mg/dL    BUN/Creatinine ratio 11 (L) 12 - 20      eGFR 57 (L) >60 ml/min/1.73m2    Calcium 8.9 8.5 - 10.1 mg/dL    Bilirubin, total 0.6 0.2 - 1.0 mg/dL    AST (SGOT) Hemolyzed, recollect requested 15 - 37 U/L    ALT (SGPT) 18 12 - 78 U/L    Alk.  phosphatase 72 45 - 117 U/L    Protein, total 7.4 6.4 - 8.2 g/dL    Albumin 3.4 (L) 3.5 - 5.0 g/dL    Globulin 4.0 2.0 - 4.0 g/dL    A-G Ratio 0.9 (L) 1.1 - 2.2     NT-PRO BNP    Collection Time: 10/15/22  1:51 AM   Result Value Ref Range    NT pro- (H) <125 pg/mL   TROPONIN-HIGH SENSITIVITY    Collection Time: 10/15/22  1:51 AM   Result Value Ref Range    Troponin-High Sensitivity 17 0 - 76 ng/L   MAGNESIUM    Collection Time: 10/15/22  1:51 AM   Result Value Ref Range    Magnesium Hemolyzed, recollect requested 1.6 - 2.4 mg/dL   CK    Collection Time: 10/15/22  1:51 AM   Result Value Ref Range     39 - 308 U/L   ETHYL ALCOHOL    Collection Time: 10/15/22  1:51 AM   Result Value Ref Range    ALCOHOL(ETHYL),SERUM <10 <10 mg/dL   D DIMER    Collection Time: 10/15/22  1:51 AM   Result Value Ref Range    D DIMER 2.20 (H) <0.50 ug/ml(FEU)   COVID-19 RAPID TEST    Collection Time: 10/15/22  2:04 AM   Result Value Ref Range    COVID-19 rapid test Not Detected Not Detected     INFLUENZA A & B AG (RAPID TEST)    Collection Time: 10/15/22  2:04 AM   Result Value Ref Range    Influenza A Antigen Negative Negative      Influenza B Antigen Negative Negative       Radiologic Studies:  CT Results  (Last 48 hours)                 10/15/22 0304  CT HEAD WO CONT Final result    Impression:  No acute abnormality. Narrative:  EXAM: CT HEAD WO CONT       INDICATION: altered mental status       COMPARISON: None. CONTRAST: None. TECHNIQUE: Unenhanced CT of the head was performed using 5 mm images. Brain and   bone windows were generated. Coronal and sagittal reformats. CT dose reduction   was achieved through use of a standardized protocol tailored for this   examination and automatic exposure control for dose modulation. FINDINGS:   The ventricles and sulci are normal in size, shape and configuration. . Lacunar   infarcts are noted in the left caudate and basal ganglia. . There is no   intracranial hemorrhage, extra-axial collection, or mass effect. The basilar   cisterns are open. No CT evidence of acute infarct. The bone windows demonstrate no abnormalities. The visualized portions of the   paranasal sinuses and mastoid air cells are clear. Vascular calcification is   noted. 10/15/22 0304  CTA CHEST W OR W WO CONT Final result    Impression:  Bibasilar dependent atelectasis. No evidence of pulmonary embolism. Narrative:  EXAM:  CTA CHEST W OR W WO CONT       INDICATION:   DD elevated, hypoxia, SOB       COMPARISON: None. TECHNIQUE:    Precontrast  images were obtained to localize the volume for acquisition. Multislice helical CT arteriography was performed from the diaphragm to the   thoracic inlet during uneventful rapid bolus of 100 cc Isovue-370. Lung and soft   tissue windows were generated. Coronal and sagittal images were generated and   3D post processing consisting of coronal maximum intensity images was performed. CT dose reduction was achieved through use of a standardized protocol tailored   for this examination and automatic exposure control for dose modulation. FINDINGS:   CHEST:   THYROID: No nodule. MEDIASTINUM: No mass or lymphadenopathy. DIANA: No mass or lymphadenopathy. THORACIC AORTA: No dissection or aneurysm. MAIN PULMONARY ARTERY: There is no evidence of pulmonary embolism. TRACHEA/BRONCHI: Patent. ESOPHAGUS: No wall thickening or dilatation. HEART: Normal in size. PLEURA: No effusion or pneumothorax. LUNGS: Bibasilar dependent atelectasis. No focal infiltrate is identified. INCIDENTALLY IMAGED UPPER ABDOMEN: Bilateral simple renal cysts, no follow-up   required. BONES: Degenerative changes are seen in the thoracic spine. CXR Results  (Last 48 hours)                 10/15/22 0207  XR CHEST PORT Final result    Impression:      No acute process on portable chest.           Narrative:  EXAM:  XR CHEST PORT       INDICATION: Hypoxia       COMPARISON: 11/7/2018       TECHNIQUE: Upright portable chest AP view       FINDINGS: The cardiomediastinal and hilar contours are within normal limits. The   pulmonary vasculature is within normal limits. The lungs and pleural spaces are clear. The visualized bones and upper abdomen   are age-appropriate.                  Medications ordered:  Medications   sodium chloride (NS) flush 5-10 mL (has no administration in time range)   cefTRIAXone (ROCEPHIN) 2 g in sterile water (preservative free) 20 mL IV syringe (2 g IntraVENous Given 10/15/22 0303)   azithromycin (ZITHROMAX) 500 mg in 0.9% sodium chloride 250 mL ([id number]) (500 mg IntraVENous New Bag 10/15/22 0302)   ondansetron (ZOFRAN) injection 4 mg (4 mg IntraVENous Given 10/15/22 0144)   sodium chloride 0.9 % bolus infusion 1,000 mL (0 mL IntraVENous IV Completed 10/15/22 0244)   iopamidoL (ISOVUE-370) 76 % injection 100 mL (100 mL IntraVENous Given 10/15/22 0304)       ED Course & Reassessment     ED Course:     ED Course as of 10/15/22 0355   Sat Oct 15, 2022   0220 Chest x-ray negative for acute pathology: no CXR evidence of pulmonary edema, pleural effusion, pneumothorax, or pneumonia. [SS]   0220 Leukocytosis noted. Hemoglobin not suggestive of acute anemia. [SS]   0236 DD elevated, will do CTPE [SS]   0236 Trop 17, will trend. [SS]   0300 No significant electrolyte derangements. Creatinine elevated, CHARLES noted. No significant transaminitis noted. Normal bilirubin. ETOH level not elevated. CPK not suggestive of significant rhabdomyolysis. BNP is not significantly elevated making acute CHF less likely. [SS]   4210 Head CT was negative for acute process making acute intracranial bleed unlikely. No evidence of large subacute stroke, ventriculomegaly, or masses. [SS]   5901 [id number]-[id number] testing is negative.   [SS]   6056 Chest CT angiography was negative for PE, pneumonia, pulmonary edema, or masses. [SS]   0341 Influenza swab negative.   [SS]      ED Course User Index  [SS] Lynette Thakur MD       Reassessment:    Will admit for AMS workup and hypoxia. Final Disposition     Admission: [place] [id number]    After completion of ED workup and discussion of results and diagnoses, patient was admitted to the hospital. Case was discussed with admitting provider. ED Procedures & EKGs   Performed by: Cheryle Rodríguez MD  Procedures     EKG interpretation (Preliminary):  Rhythm: normal sinus rhythm; and regular . Rate (approx.): 93. Axis: normal;  LA interval: normal;  QRS interval: normal ;  ST/T wave: normal;  Other findings: normal.    Clinical Tools & Critical Care     HEART SCORE  Heart score not applicable: no chest pain . SEPSIS REASSESSMENT NOTE  Sepsis Reassessment:    The patient meets criteria for Sepsis due to a suspected source being Sepsis of Unknown Origin  Cultures and antibiotics were initiated sequentially and appropriately as per orders.    Fluid resuscitation volume with 30ml/kg crystalloid bolus was: NOT INDICATED: the patient did NOT meet criteria for severe sepsis or septic shock OR had any hypotensive blood pressure readings at any point during their evaluation. A crystalloid bolus of 1000ml was given for sepsis. I have performed a sepsis reassessment of the patient's clinical volume status and tissue perfusion after the final volume of target fluid was initiated, at 3:55 AM, and the patient is adequately resuscitated. CRITICAL CARE DOCUMENTATION  NOT MET: Critical care billing criteria and/or time were NOT met. Diagnosis     Clinical Impression:   1. Hypoxia    2. Delirium    3. Sepsis, due to unspecified organism, unspecified whether acute organ dysfunction present [facility])        Attestations:  Jamar Guerrier MD    Documentation Comments   - I am the first and primary provider for this patient and am the primary provider of record. - Initial assessment performed. The patients presenting problems have been discussed, and the staff are in agreement with the care plan formulated and outlined with them. I have encouraged them to ask questions as they arise throughout their visit. - Available medical records, nursing notes, old EKGs, and EMS run sheets (if patient was EMS transported) were reviewed    Please note that this dictation was completed with [employer], the computer voice recognition software. Quite often unanticipated grammatical, syntax, homophones, and other interpretive errors are inadvertently transcribed by the computer software. Please disregard these errors. Please excuse any errors that have escaped final proofreading.

## 2022-10-15 NOTE — ROUTINE PROCESS
TRANSFER - OUT REPORT:    Verbal report given to Guillermo(name) on Amalia Yoder  being transferred to (unit) for routine progression of care       Report consisted of patients Situation, Background, Assessment and   Recommendations(SBAR). Information from the following report(s) SBAR, ED Summary, and MAR was reviewed with the receiving nurse. Lines:   Peripheral IV 10/15/22 Right Antecubital (Active)       Peripheral IV 10/15/22 Left Antecubital (Active)        Opportunity for questions and clarification was provided.       Patient transported with:   Monitor  Tech

## 2022-10-15 NOTE — ED NOTES
RN assumed care of pt at this time, pt resting in stretcher, NAD noted. Pt drowsy, but alert to verbal stimulus. Call bell within reach.

## 2022-10-16 PROBLEM — A08.4 VIRAL GASTROENTERITIS: Status: ACTIVE | Noted: 2022-10-16

## 2022-10-16 LAB
ALBUMIN SERPL-MCNC: 3.3 G/DL (ref 3.5–5)
ANION GAP SERPL CALC-SCNC: 8 MMOL/L (ref 5–15)
BUN SERPL-MCNC: 16 MG/DL (ref 6–20)
BUN/CREAT SERPL: 16 (ref 12–20)
CA-I BLD-MCNC: 8.4 MG/DL (ref 8.5–10.1)
CHLORIDE SERPL-SCNC: 105 MMOL/L (ref 97–108)
CK SERPL-CCNC: 203 U/L (ref 39–308)
CO2 SERPL-SCNC: 23 MMOL/L (ref 21–32)
CREAT SERPL-MCNC: 1 MG/DL (ref 0.7–1.3)
ERYTHROCYTE [DISTWIDTH] IN BLOOD BY AUTOMATED COUNT: 12.7 % (ref 11.5–14.5)
EST. AVERAGE GLUCOSE BLD GHB EST-MCNC: 117 MG/DL
GLUCOSE BLD STRIP.AUTO-MCNC: 79 MG/DL (ref 65–100)
GLUCOSE BLD STRIP.AUTO-MCNC: 80 MG/DL (ref 65–100)
GLUCOSE BLD STRIP.AUTO-MCNC: 83 MG/DL (ref 65–100)
GLUCOSE BLD STRIP.AUTO-MCNC: 99 MG/DL (ref 65–100)
GLUCOSE SERPL-MCNC: 85 MG/DL (ref 65–100)
HBA1C MFR BLD: 5.7 % (ref 4–5.6)
HCT VFR BLD AUTO: 41.1 % (ref 36.6–50.3)
HGB BLD-MCNC: 13.9 G/DL (ref 12.1–17)
MCH RBC QN AUTO: 31 PG (ref 26–34)
MCHC RBC AUTO-ENTMCNC: 33.8 G/DL (ref 30–36.5)
MCV RBC AUTO: 91.5 FL (ref 80–99)
NRBC # BLD: 0 K/UL (ref 0–0.01)
NRBC BLD-RTO: 0 PER 100 WBC
PERFORMED BY, TECHID: NORMAL
PHOSPHATE SERPL-MCNC: 2.1 MG/DL (ref 2.6–4.7)
PLATELET # BLD AUTO: 224 K/UL (ref 150–400)
PMV BLD AUTO: 10.3 FL (ref 8.9–12.9)
POTASSIUM SERPL-SCNC: 3.9 MMOL/L (ref 3.5–5.1)
RBC # BLD AUTO: 4.49 M/UL (ref 4.1–5.7)
SODIUM SERPL-SCNC: 136 MMOL/L (ref 136–145)
TSH SERPL DL<=0.05 MIU/L-ACNC: 0.27 UIU/ML (ref 0.36–3.74)
WBC # BLD AUTO: 8.6 K/UL (ref 4.1–11.1)

## 2022-10-16 PROCEDURE — 84443 ASSAY THYROID STIM HORMONE: CPT

## 2022-10-16 PROCEDURE — 82550 ASSAY OF CK (CPK): CPT

## 2022-10-16 PROCEDURE — 74011000250 HC RX REV CODE- 250: Performed by: STUDENT IN AN ORGANIZED HEALTH CARE EDUCATION/TRAINING PROGRAM

## 2022-10-16 PROCEDURE — 83036 HEMOGLOBIN GLYCOSYLATED A1C: CPT

## 2022-10-16 PROCEDURE — 65270000029 HC RM PRIVATE

## 2022-10-16 PROCEDURE — 74011000250 HC RX REV CODE- 250: Performed by: HOSPITALIST

## 2022-10-16 PROCEDURE — 85027 COMPLETE CBC AUTOMATED: CPT

## 2022-10-16 PROCEDURE — 74011250637 HC RX REV CODE- 250/637: Performed by: HOSPITALIST

## 2022-10-16 PROCEDURE — 82306 VITAMIN D 25 HYDROXY: CPT

## 2022-10-16 PROCEDURE — 96376 TX/PRO/DX INJ SAME DRUG ADON: CPT

## 2022-10-16 PROCEDURE — 82962 GLUCOSE BLOOD TEST: CPT

## 2022-10-16 PROCEDURE — 80069 RENAL FUNCTION PANEL: CPT

## 2022-10-16 PROCEDURE — 74011250636 HC RX REV CODE- 250/636: Performed by: STUDENT IN AN ORGANIZED HEALTH CARE EDUCATION/TRAINING PROGRAM

## 2022-10-16 PROCEDURE — G0378 HOSPITAL OBSERVATION PER HR: HCPCS

## 2022-10-16 PROCEDURE — 74011250637 HC RX REV CODE- 250/637: Performed by: INTERNAL MEDICINE

## 2022-10-16 RX ORDER — LOPERAMIDE HYDROCHLORIDE 2 MG/1
4 CAPSULE ORAL
Status: DISCONTINUED | OUTPATIENT
Start: 2022-10-16 | End: 2022-10-17 | Stop reason: HOSPADM

## 2022-10-16 RX ADMIN — METOPROLOL SUCCINATE 25 MG: 25 TABLET, EXTENDED RELEASE ORAL at 08:49

## 2022-10-16 RX ADMIN — CEFTRIAXONE 2 G: 2 INJECTION, POWDER, FOR SOLUTION INTRAMUSCULAR; INTRAVENOUS at 02:04

## 2022-10-16 RX ADMIN — SODIUM CHLORIDE, PRESERVATIVE FREE 10 ML: 5 INJECTION INTRAVENOUS at 05:41

## 2022-10-16 RX ADMIN — LOPERAMIDE HYDROCHLORIDE 4 MG: 2 CAPSULE ORAL at 12:14

## 2022-10-16 RX ADMIN — SODIUM CHLORIDE, PRESERVATIVE FREE 10 ML: 5 INJECTION INTRAVENOUS at 16:50

## 2022-10-16 RX ADMIN — AZITHROMYCIN DIHYDRATE 500 MG: 500 INJECTION, POWDER, LYOPHILIZED, FOR SOLUTION INTRAVENOUS at 02:04

## 2022-10-16 NOTE — PROGRESS NOTES
As previously noted:       CM attempted to complete assessment with patient at bedside, but patient is alert to self only. Patient's phone number in chart was called and is not a correct number for patient or anyone related to patient. No other contacts noted for patient. At this time patient is listed as Self-pay. If patient is to have any CM needs at discharge, family and insurance would need to be verified. CM tried to call the number today no answer. However, CM met with patient at bedside. Patient seemed to know that he lives alone and a friend will come to transport him at discharge. He does have a cane and crutches and he states that he has family. CM could not reach them.

## 2022-10-16 NOTE — PROGRESS NOTES
Patient requesting to take a shower, reassured will check with his Primary Physician. Physician called and gave order for patient to shower.

## 2022-10-16 NOTE — PROGRESS NOTES
Patient A&O x4. VSS on room air. Meds given as ordered. Denies pain. Sitter at bedside for fall precautions. R PIV was removed by patient when he got out of bed suddenly to use the bathroom. 1 BM this shift. Patient asleep throughout the night. Bed low, wheels locked. Call bell within reach. No needs or concerns at this time. Problem: Falls - Risk of  Goal: *Absence of Falls  Description: Document Sofia Fothergill Fall Risk and appropriate interventions in the flowsheet.   10/16/2022 0549 by Marleen Mathew RN  Outcome: Progressing Towards Goal  10/16/2022 0549 by Marleen Mathew RN  Outcome: Progressing Towards Goal     Problem: Patient Education: Go to Patient Education Activity  Goal: Patient/Family Education  10/16/2022 0549 by Marleen Mathew RN  Outcome: Progressing Towards Goal  10/16/2022 0549 by Marleen Mathew RN  Outcome: Progressing Towards Goal

## 2022-10-16 NOTE — PROGRESS NOTES
15:00 patient made awre he can take a shower, wrapped up iv site. Patient complaining room cold. Reassured will place work order since this nurse was not able to make his room warmer. 15:10 went to check on patient, patient standing in room states haven't taken shower as yet, instructed to let know when he is finished. room and bathroom smell like cigarette smoke. Patient denies smoking. Nurse Practitianor passing by patient room stated smell like someone is smoking. 15:22 Security and Nurse manager notified of the situation.

## 2022-10-16 NOTE — ROUTINE PROCESS
Bedside and Verbal shift change report given to barry (oncoming nurse) by Branden Kirkpatrick (offgoing nurse). Report included the following information SBAR, Kardex, and MAR.

## 2022-10-16 NOTE — PROGRESS NOTES
46 Physician at bedside. Questioned why patient has a sitter made aware it was for safety. States to discontinue the sitter, patient going home tomorrow. Nursing supervisor notified.

## 2022-10-16 NOTE — PROGRESS NOTES
Patient states not taking shower. Its too cold. Iv flushed and fluids restarted. Warm blanket applied. Blood sugar  83, patient encouraged to eat supper.

## 2022-10-16 NOTE — PROGRESS NOTES
Hospitalist Progress Note               Daily Progress Note: 10/16/2022      Subjective:   Full course to date:  He is a 44-year-old male with history of CVA, hypertension and traumatic brain injury brought in due to altered mental status. He was apparently found unresponsive on the floor by his roommate. His lactate was elevated at 2.5. There was some concern for sepsis although no source identified. Patient was awake and alert on admission. He was seen by neurology   who feels this may represent a toxic encephalopathy. No further neurologic work-up was recommended    Procalcitonin was less than 0.05    He was empirically started on ceftriaxone and Zithromax    --------  Patient is seen this morning for follow-up. He is awake and alert, no confusion. He says he feels miserable, started with profuse diarrhea yesterday morning along with nausea and vomiting. He denies abdominal pain.   Nursing staff corroborates significant diarrhea    Problem List:  Problem List as of 10/16/2022 Date Reviewed: 10/15/2022            Codes Class Noted - Resolved    Altered mental status ICD-10-CM: R41.82  ICD-9-CM: 780.97  10/15/2022 - Present           Medications reviewed  Current Facility-Administered Medications   Medication Dose Route Frequency    sodium chloride (NS) flush 5-10 mL  5-10 mL IntraVENous PRN    cefTRIAXone (ROCEPHIN) 2 g in sterile water (preservative free) 20 mL IV syringe  2 g IntraVENous Q24H    azithromycin (ZITHROMAX) 500 mg in 0.9% sodium chloride 250 mL ([id number])  500 mg IntraVENous Q24H    sodium chloride (NS) flush 5-40 mL  5-40 mL IntraVENous Q8H    sodium chloride (NS) flush 5-40 mL  5-40 mL IntraVENous PRN    acetaminophen (TYLENOL) tablet 650 mg  650 mg Oral Q6H PRN    Or    acetaminophen (TYLENOL) suppository 650 mg  650 mg Rectal Q6H PRN    ondansetron (ZOFRAN ODT) tablet 4 mg  4 mg Oral Q6H PRN    Or    ondansetron (ZOFRAN) injection 4 mg  4 mg IntraVENous Q6H PRN    0.9% sodium chloride infusion  100 mL/hr IntraVENous CONTINUOUS    insulin lispro (HUMALOG) injection   SubCUTAneous AC&HS    glucose chewable tablet 16 g  4 Tablet Oral PRN    glucagon (GLUCAGEN) injection 1 mg  1 mg IntraMUSCular PRN    metoprolol succinate (TOPROL-XL) XL tablet 25 mg  25 mg Oral DAILY       Review of Systems:   A comprehensive review of systems was negative except for that written in the HPI. Objective:   Physical Exam:     Visit Vitals  /78 (BP Patient Position: At rest;Lying)   Pulse 98   Temp 100.1 °F (37.8 °C)   Resp 18   Ht 5' 8\" (1.727 m)   Wt 68 kg (150 lb)   SpO2 96%   BMI 22.81 kg/m²           Temp (24hrs), Av.9 °F (37.2 °C), Min:97.7 °F (36.5 °C), Max:100.1 °F (37.8 °C)    No intake/output data recorded. No intake/output data recorded. General:   Awake and alert   Lungs:   Clear to auscultation bilaterally. Chest wall:  No tenderness or deformity. Heart:  Regular rate and rhythm, S1, S2 normal, no murmur, click, rub or gallop. Abdomen:   Soft, non-tender. Bowel sounds normal. No masses,  No organomegaly. Extremities: Extremities normal, atraumatic, no cyanosis or edema. Pulses: 2+ and symmetric all extremities. Skin: Skin color, texture, turgor normal. No rashes or lesions   Neurologic: CNII-XII intact. No gross focal deficits         Data Review:       Recent Days:  Recent Labs     10/15/22  0151   WBC 20.8*   HGB 16.1   HCT 46.5        Recent Labs     10/15/22  0256 10/15/22  0151   NA  --  133*   K 3.0* Hemolyzed, recollect requested   CL  --  101   CO2  --  22   GLU  --  259*   BUN  --  15   CREA  --  1.33*   CA  --  8.9   MG 2.0 Hemolyzed, recollect requested   ALB  --  3.4*   TBILI  --  0.6   ALT  --  18     No results for input(s): PH, PCO2, PO2, HCO3, FIO2 in the last 72 hours.     24 Hour Results:  Recent Results (from the past 24 hour(s))   GLUCOSE, POC    Collection Time: 10/15/22 12:14 PM   Result Value Ref Range    Glucose (POC) 157 (H) 65 - 100 mg/dL    Performed by Paul Ba    GLUCOSE, POC    Collection Time: 10/15/22  4:15 PM   Result Value Ref Range    Glucose (POC) 165 (H) 65 - 100 mg/dL    Performed by Jayne Lynn, POC    Collection Time: 10/15/22  9:06 PM   Result Value Ref Range    Glucose (POC) 105 (H) 65 - 100 mg/dL    Performed by [id number], POC    Collection Time: 10/16/22  7:35 AM   Result Value Ref Range    Glucose (POC) 80 65 - 100 mg/dL    Performed by Santo Gautam        CT HEAD WO CONT   Final Result   No acute abnormality. CTA CHEST W OR W WO CONT   Final Result   Bibasilar dependent atelectasis. No evidence of pulmonary embolism. XR CHEST PORT   Final Result      No acute process on portable chest.              Assessment:  Suspected acute viral gastroenteritis    Probable toxic encephalopathy, resolved    History of traumatic brain injury    Essential hypertension    History of CVA      Plan:  Continue IV fluids  Zofran and Lomotil  Stop antibiotics  Change to inpatient status      Care Plan discussed with: Patient/Family    Disposition: To be discharged in 24 hours    Total time spent with patient: 30 minutes.     Jameel Olson MD

## 2022-10-17 VITALS
HEART RATE: 81 BPM | SYSTOLIC BLOOD PRESSURE: 170 MMHG | HEIGHT: 68 IN | DIASTOLIC BLOOD PRESSURE: 94 MMHG | OXYGEN SATURATION: 94 % | WEIGHT: 147.27 LBS | TEMPERATURE: 98.2 F | RESPIRATION RATE: 22 BRPM | BODY MASS INDEX: 22.32 KG/M2

## 2022-10-17 NOTE — PROGRESS NOTES
Patient came to nurses station earlier requesting papers to go home made aware doctor has not come as yet. Questioned what time doctor was coming made aware doesn't know the time. Went back to room, when CNA went to get blood sugar , patient was found not in his room. Missing person code called. Patient not found or return to room. Physician called and informed. States to let him go and count him as an elopement. Missing person code cancelled.

## 2022-10-17 NOTE — DISCHARGE SUMMARY
Physician Discharge Summary     Patient ID:    Alva Lin  [id number]  51 y.o.  1950    Admit date: 10/15/2022    Discharge date : 10/17/2022    Chronic Diagnoses:    Problem List as of 10/17/2022 Date Reviewed: 10/15/2022            Codes Class Noted - Resolved    Viral gastroenteritis ICD-10-CM: A08.4  ICD-9-CM: 008.8  10/16/2022 - Present        Altered mental status ICD-10-CM: R41.82  ICD-9-CM: 780.97  10/15/2022 - Present       22    Final Diagnoses: Altered mental status [R41.82]  Viral gastroenteritis [A08.4]  Toxic encephalopathy  Essential hypertension  History of CVA  History of traumatic brain injury    Reason for Hospitalization:  77-year-old male with history of CVA, hypertension and traumatic brain injury brought in due to altered mental status. He was apparently found unresponsive on the floor by his roommate. His lactate was elevated at 2.5. There was some concern for sepsis although no source identified. Patient was awake and alert on admission. He was seen by neurology   who feels this may represent a toxic encephalopathy. No further neurologic work-up was recommended     Procalcitonin was less than 0.05     He was empirically started on ceftriaxone and Zithromax         Hospital Course:     Patient was admitted. When I assumed his care on 10/16 I stopped his ceftriaxone and Zithromax as there was no evidence for infection    His mentation was back to normal at that time. However he had developed acute vomiting and diarrhea consistent with viral gastroenteritis and did not feel well enough to go home    My plan was for likely discharge on 10/17 but patient ended up eloping prior to being seen        Discharge Medications: There are no discharge medications for this patient. Follow up Care:    1. Tonie Noyola, DO in 1-2 weeks. Please call to set up an appointment shortly after discharge. Diet:  Regular Diet    Disposition:  Home.     Advanced Directive:   FULL    DNR      Discharge Exam:  Not performed, patient eloped    CONSULTATIONS: Neurology    Significant Diagnostic Studies:   10/15/2022: BUN 15 mg/dL (Ref range: 6 - 20 mg/dL); Calcium 8.9 mg/dL (Ref range: 8.5 - 10.1 mg/dL); CO2 22 mmol/L (Ref range: 21 - 32 mmol/L); Creatinine 1.33 mg/dL (H; Ref range: 0.70 - 1.30 mg/dL); Glucose 259 mg/dL (H; Ref range: 65 - 100 mg/dL); HCT 46.5 % (Ref range: 36.6 - 50.3 %); HGB 16.1 g/dL (Ref range: 12.1 - 17.0 g/dL); Potassium Hemolyzed, recollect requested mmol/L (Ref range: 3.5 - 5.1 mmol/L); Potassium 3.0 mmol/L (L; Ref range: 3.5 - 5.1 mmol/L); Sodium 133 mmol/L (L; Ref range: 136 - 145 mmol/L)  10/16/2022: BUN 16 mg/dL (Ref range: 6 - 20 mg/dL); Calcium 8.4 mg/dL (L; Ref range: 8.5 - 10.1 mg/dL); CO2 23 mmol/L (Ref range: 21 - 32 mmol/L); Creatinine 1.00 mg/dL (Ref range: 0.70 - 1.30 mg/dL); Glucose 85 mg/dL (Ref range: 65 - 100 mg/dL); HCT 41.1 % (Ref range: 36.6 - 50.3 %); HGB 13.9 g/dL (Ref range: 12.1 - 17.0 g/dL); Potassium 3.9 mmol/L (Ref range: 3.5 - 5.1 mmol/L); Sodium 136 mmol/L (Ref range: 136 - 145 mmol/L)  Recent Labs     10/16/22  1323 10/15/22  0151   WBC 8.6 20.8*   HGB 13.9 16.1   HCT 41.1 46.5    333     Recent Labs     10/16/22  1323 10/15/22  0256 10/15/22  0151     --  133*   K 3.9 3.0* Hemolyzed, recollect requested     --  101   CO2 23  --  22   BUN 16  --  15   CREA 1.00  --  1.33*   GLU 85  --  259*   CA 8.4*  --  8.9   MG  --  2.0 Hemolyzed, recollect requested   PHOS 2.1*  --   --      Recent Labs     10/16/22  1323 10/15/22  0151   ALT  --  18   AP  --  72   TBILI  --  0.6   TP  --  7.4   ALB 3.3* 3.4*   GLOB  --  4.0     No results for input(s): INR, PTP, APTT, INREXT in the last 72 hours. No results for input(s): FE, TIBC, PSAT, FERR in the last 72 hours. No results for input(s): PH, PCO2, PO2 in the last 72 hours.   Recent Labs     10/16/22  1323 10/15/22  0151    202     Lab Results   Component Value Date/Time    Glucose (POC) 99 10/16/2022 07:18 PM    Glucose (POC) 83 10/16/2022 04:37 PM    Glucose (POC) 79 10/16/2022 12:13 PM    Glucose (POC) 80 10/16/2022 07:35 AM    Glucose (POC) 105 (H) 10/15/2022 09:06 PM       Discharge time spent 35 minutes    Signed:  Aly Muñoz MD  10/17/2022  10:48 AM

## 2022-10-17 NOTE — PROGRESS NOTES
0725: Chart reviewed. Per MD note, patient's AMS has improved. CM will attempt to speak with patient this day in regards discharge planning, insurance info and emergency contact.

## 2022-10-17 NOTE — PROGRESS NOTES
Problem: Falls - Risk of  Goal: *Absence of Falls  Description: Document Cherylle Cockayne Fall Risk and appropriate interventions in the flowsheet.   Outcome: Progressing Towards Goal  Note: Fall Risk Interventions:            Medication Interventions: Teach patient to arise slowly

## 2022-10-17 NOTE — PROGRESS NOTES
During bedside shift report pt stated that he does not want anymore IV fluids. Offgoing nurse confirmed with pt the refusal.    Pt subsequently removed his IV following removal of the IV fluids.

## 2022-10-21 LAB
BACTERIA SPEC CULT: NORMAL
SPECIAL REQUESTS,SREQ: NORMAL

## 2023-06-04 ENCOUNTER — APPOINTMENT (OUTPATIENT)
Facility: HOSPITAL | Age: 73
End: 2023-06-04

## 2023-06-04 ENCOUNTER — HOSPITAL ENCOUNTER (EMERGENCY)
Facility: HOSPITAL | Age: 73
Discharge: LEFT BEFORE DISCHARGE | End: 2023-06-04

## 2023-06-04 VITALS
TEMPERATURE: 97.8 F | SYSTOLIC BLOOD PRESSURE: 196 MMHG | HEIGHT: 68 IN | DIASTOLIC BLOOD PRESSURE: 116 MMHG | OXYGEN SATURATION: 98 % | HEART RATE: 100 BPM | WEIGHT: 155 LBS | BODY MASS INDEX: 23.49 KG/M2 | RESPIRATION RATE: 18 BRPM

## 2023-06-04 ASSESSMENT — LIFESTYLE VARIABLES
HOW OFTEN DO YOU HAVE A DRINK CONTAINING ALCOHOL: MONTHLY OR LESS
HOW MANY STANDARD DRINKS CONTAINING ALCOHOL DO YOU HAVE ON A TYPICAL DAY: 1 OR 2

## 2023-06-04 ASSESSMENT — PAIN - FUNCTIONAL ASSESSMENT: PAIN_FUNCTIONAL_ASSESSMENT: NONE - DENIES PAIN

## 2023-06-05 NOTE — ED NOTES
Pt states he is tired of waiting, and left ER.  Ambulatory through front entrance     [facility], [address] [place] [address]  06/04/23 [id number]

## 2024-08-02 ENCOUNTER — ANESTHESIA EVENT (OUTPATIENT)
Facility: HOSPITAL | Age: 74
End: 2024-08-02
Payer: MEDICARE

## 2024-08-02 ENCOUNTER — HOSPITAL ENCOUNTER (OUTPATIENT)
Facility: HOSPITAL | Age: 74
Setting detail: OUTPATIENT SURGERY
Discharge: HOME | End: 2024-08-02
Attending: SPECIALIST | Admitting: SPECIALIST
Payer: MEDICARE

## 2024-08-02 ENCOUNTER — ANESTHESIA (OUTPATIENT)
Facility: HOSPITAL | Age: 74
End: 2024-08-02
Payer: MEDICARE

## 2024-08-02 VITALS
WEIGHT: 120.37 LBS | SYSTOLIC BLOOD PRESSURE: 193 MMHG | BODY MASS INDEX: 18.24 KG/M2 | OXYGEN SATURATION: 98 % | TEMPERATURE: 97.1 F | DIASTOLIC BLOOD PRESSURE: 107 MMHG | HEIGHT: 68 IN | HEART RATE: 82 BPM | RESPIRATION RATE: 14 BRPM

## 2024-08-02 PROCEDURE — 7100000010 HC PHASE II RECOVERY - FIRST 15 MIN: Performed by: SPECIALIST

## 2024-08-02 PROCEDURE — 3600007502: Performed by: SPECIALIST

## 2024-08-02 RX ORDER — SODIUM CHLORIDE 0.9 % (FLUSH) 0.9 %
5-40 SYRINGE (ML) INJECTION PRN
Status: DISCONTINUED | OUTPATIENT
Start: 2024-08-02 | End: 2024-08-02 | Stop reason: HOSPADM

## 2024-08-02 RX ORDER — LISINOPRIL 10 MG/1
10 TABLET ORAL DAILY
COMMUNITY

## 2024-08-02 RX ORDER — SIMETHICONE 40MG/0.6ML
40 SUSPENSION, DROPS(FINAL DOSAGE FORM)(ML) ORAL AS NEEDED
Status: DISCONTINUED | OUTPATIENT
Start: 2024-08-02 | End: 2024-08-02 | Stop reason: HOSPADM

## 2024-08-02 RX ORDER — SODIUM CHLORIDE 9 MG/ML
INJECTION, SOLUTION INTRAVENOUS CONTINUOUS
Status: DISCONTINUED | OUTPATIENT
Start: 2024-08-02 | End: 2024-08-02 | Stop reason: HOSPADM

## 2024-08-02 RX ORDER — QUETIAPINE 200 MG/1
200 TABLET, FILM COATED, EXTENDED RELEASE ORAL NIGHTLY
COMMUNITY

## 2024-08-02 ASSESSMENT — COPD QUESTIONNAIRES: CAT_SEVERITY: MODERATE

## 2024-08-02 ASSESSMENT — LIFESTYLE VARIABLES: SMOKING_STATUS: 1

## 2024-08-02 NOTE — ANESTHESIA PRE PROCEDURE
Department of Anesthesiology  Preprocedure Note       Name:  Reid Sanchez   Age:  74 y.o.  :  1950                                          MRN:  [id number]         Date:  2024      Surgeon: Surgeon(s):  Demond Finley MD    Procedure: Procedure(s):  COLONOSCOPY DIAGNOSTIC    Medications prior to admission:   Prior to Admission medications    Not on File       Current medications:    Current Facility-Administered Medications   Medication Dose Route Frequency Provider Last Rate Last Admin   • 0.9 % sodium chloride infusion   IntraVENous Continuous Demond Finley MD       • sodium chloride flush 0.9 % injection 5-40 mL  5-40 mL IntraVENous PRN Demond Finley MD       • simethicone (MYLICON) 40 MG/0.6ML suspension drops 40 mg  40 mg Oral PRN Demond Finley MD           Allergies:  No Known Allergies    Problem List:    Patient Active Problem List   Diagnosis Code   • Altered mental status R41.82   • Viral gastroenteritis A08.4       Past Medical History:        Diagnosis Date   • CVA (cerebral vascular accident) (HCC)     Lacunar   • HTN (hypertension)    • Left eye injury     With ectropion   • Traumatic brain injury (HCC)        Past Surgical History:  No past surgical history on file.    Social History:    Social History     Tobacco Use   • Smoking status: Every Day     Current packs/day: 1.00     Types: Cigarettes   • Smokeless tobacco: Never   Substance Use Topics   • Alcohol use: Not Currently                                Ready to quit: Not Answered  Counseling given: Not Answered      Vital Signs (Current): There were no vitals filed for this visit.                                           BP Readings from Last 3 Encounters:   23 (!) 196/116       NPO Status:                                                                                 BMI:   Wt Readings from Last 3 Encounters:   23 70.3 kg (155 lb)     There is no height or weight on file to calculate    Endo/Other: Negative Endo/Other ROS                    Abdominal: normal exam            Vascular: negative vascular ROS.         Other Findings:       Anesthesia Plan      MAC     ASA 3             Anesthetic plan and risks discussed with patient.    Use of blood products discussed with patient whom.    Plan discussed with CRNA.                Neftali Kim MD   8/2/2024

## 2024-08-02 NOTE — PROGRESS NOTES
Pt prep incomplete. Dr. Malia dickinson pt @ [facility] w/ rectal exam. Pt to be rescheduled. D/c'd per criteria.

## 2024-08-02 NOTE — H&P
Date: 5/3/2024 11:20 AM   Patient Name: Reid Arredondo   Account #: [id number]    Gender: Male    (age): 1950 (73)      Provider: KENNEDY Amato      Referring Physician: Irene Nunes  [address] [place] [address] [phone] (phone)  [phone] (fax)      Chief Complaint: blood in stool           History of Present Illness:   He's been having blood in his stool and more loose bowels. He estimates having 2-3 times a day and it can urgent, with fecal incontinence. It it typically soft. he denies abd pain, rectal pain. He reports loss of appetite and has lost weight. He estimates ~30lb weight loss in the past 2-3 years. He had C diff in 2018 or 2019. His last colonoscopy was ~7 years ago and had polyps.        Past Medical History      Medical Conditions: C.Diff (Clostridioides difficile)  High blood pressure  Hx of Colon Polyps   Surgical Procedures: Eye Surgery  Nasal Surgery   Dx Studies: Colonoscopy, ~2017, polyps per patient   Medications: lisinopril 10 mg  peg 3350-electrolytes 236-22.74-6.74-5.86 gram Follow prep instructions from your doctor's office, not the brand label.  quetiapine 100 mg   Allergies: Patient has no known allergies or drug allergies   Immunizations: Influenza, seasonal, injectable, 2023      Social History      Alcohol: None   Tobacco: Current every day smoker   Drugs: None   Caffeine: None        \" displaymode=\"List\" defaultnarrative=\"[object Object]\" isempty=\"false\" style=\"text-rendering: geometricprecision; font-family: \"Open Sans\", sans-serif !important; -webkit-font-smoothing: antialiased !important; user-select: text !important;\">  Impressions: Blood in stool   Change in bowel habit   Weight Loss   Loss of appetite   Personal history of colonic polyps       Assessment: 72yo M presents for evaluation of blood in stool. change in BMs, weight loss. Reviewed records, no previous endoscopic procedure on file. Request imaging from [facility]. Schedule